# Patient Record
(demographics unavailable — no encounter records)

---

## 2025-06-20 NOTE — PHYSICAL EXAM
[Normal Sclera/Conjunctiva] : normal sclera/conjunctiva [PERRL] : pupils equal round and reactive to light [EOMI] : extraocular movements intact [Normal Oropharynx] : the oropharynx was normal [Normal TMs] : both tympanic membranes were normal [Normal Nasal Mucosa] : the nasal mucosa was normal [No Carotid Bruits] : no carotid bruits [Pedal Pulses Present] : the pedal pulses are present [No Palpable Aorta] : no palpable aorta [No Extremity Clubbing/Cyanosis] : no extremity clubbing/cyanosis [Declined Breast Exam] : declined breast exam  [Normal Supraclavicular Nodes] : no supraclavicular lymphadenopathy [Normal Axillary Nodes] : no axillary lymphadenopathy [Normal Posterior Cervical Nodes] : no posterior cervical lymphadenopathy [Normal Anterior Cervical Nodes] : no anterior cervical lymphadenopathy [Normal Inguinal Nodes] : no inguinal lymphadenopathy [Kyphosis] : kyphosis [Scoliosis] : scoliosis [Normal] : affect was normal and insight and judgment were intact [de-identified] : Left greater than right cataracts. [de-identified] : Mild right greater than left earwax removed without complication. [de-identified] : Distal right lower extremity nonpitting edema.  Cool toes and feet, no libido pattern and excellent pulses with brisk capillary refill. [de-identified] : Lumbar midline scar consistent with prior laminectomy. [de-identified] : Mild right trochanteric tenderness with gentle palpation with hip and knee flexed. [de-identified] : Dry, friable. [de-identified] : Decreased sensation starting above her ankles to monofilament testing.

## 2025-06-20 NOTE — ASSESSMENT
[Vaccines Reviewed] : Immunizations reviewed today. Please see immunization details in the vaccine log within the immunization flowsheet.  [FreeTextEntry1] : *Metastatic papillary serous endometrial carcinoma.IIIC2 11/11/2016 robotic TLH/BSO/debulking of extensive retroperitoneal nodes, carbo/taxol x 4, Carbo/taxotere x 1 (completed 2/2017, last cycle held due to tremors requiring hospitalization). Extended field EBRT 5040 cGy, Vaginal brachytherapy 1200 cGy (5/4-6/20/17). Possible complication of tamoxifen.Follows closely with Dr. Uzma Gay.  *Lumbar ddd and scoliosis complicated by sciatica. Dr. Bustos and Dr. Foy s/p laminectomy February 2023  *R hip pain, apparent bursitis. 2024 hip MRI without overt hip joint pathology. Bilateral inguinal hernia repairs 2024 without pain improvement. Bursal injection relieves her pain for about 9 months.Physical therapy also helping.  Ices as well, discussed Game ready ice sleeve.  Sees Dr. Mcbride in pain medicine.  *chemotherapy induced peripheral neuropathy.  Sensation decreasedBelow Bilateral ankles, Minimal involvement of fingertips per patient report..  *HTN. *Mild creatinine elevation, 1.3-1.5.Blood pressure Seemingly well-controlled on losartan.  Recent normal BMP.  Check urine microalbumin.  *HLD. lipids LDL 52 HDL 63 triglyceride 148 2024.  *Minimal AST elevation.  Regular wine, also Crestor.  *Macrocytosis *Borderline vitamin B12 level. Glass of wine most nights.  Also prior chemotherapy.  *GERD.  Reports  esophagitis large hiatal hernia seemingly H. pylori negative last EGD perhaps at time of last colonoscopy if not more recently. Hesitant to use PPI more than 2 months a year, uses Pepcid instead seeming with adequate control.  *Routine adult health maintenance Vacc:Tdap uncertain Prevnar 22,024 up-to-date COVID flu.  Encouraged RSV.  Asked to bring vaccination record in, query Shingrix. Colon CA scrn:No family history colorectal cancer.  Multiple colonoscopies never adenomas most recently December 2023.  Patient and her gastroenterologist have agreed to discontinue Screening. Breast CA Surveillance/screening: Status post bilateral lumpectomy for left DCIS, nonneoplastic right breast pathology.  Closely followed in oncology. Oporosis screening: DEXA 5/2021 Osteoporosis femoral nect -2.5, spine -1.7, More recent severe osteopenia.To bring last report in. Hep B C HIV screening ordered.Practicing gynecologist.  It was a pleasure to visit with Ms. Sampson today.  Answered all questions as best I could. Disposition portal results.  Follow-up 6 months.

## 2025-06-20 NOTE — HISTORY OF PRESENT ILLNESS
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  She has no acute concerns.  She brings in labs from recent testing notable for  creatinine 1.3 rising to 1.5 in 2025,  AST 49,  -105 without anemia.  LFTs were unremarkable otherwise.  Drinks a glass of wine most nights. Labs showed normal TSH excellent hyperlipidemia control. B12 runs at the lower limit of normal around 225 when she does not supplement.

## 2025-06-20 NOTE — HEALTH RISK ASSESSMENT
[Excellent] : ~his/her~  mood as  excellent [Yes] : Yes [4 or more  times a week (4 pts)] : 4 or more  times a week (4 points) [1 or 2 (0 pts)] : 1 or 2 (0 points) [Never (0 pts)] : Never (0 points) [No] : In the past 12 months have you used drugs other than those required for medical reasons? No [No falls in past year] : Patient reported no falls in the past year [0] : 2) Feeling down, depressed, or hopeless: Not at all (0) [PHQ-2 Negative - No further assessment needed] : PHQ-2 Negative - No further assessment needed [Never] : Never [NO] : No [Patient reported mammogram was abnormal] : Patient reported mammogram was abnormal [Patient declined PAP Smear] : Patient declined PAP Smear [Patient reported bone density results were abnormal] : Patient reported bone density results were abnormal [Patient reported colonoscopy was normal] : Patient reported colonoscopy was normal [HIV Test offered] : HIV Test offered [Hepatitis C test offered] : Hepatitis C test offered [None] : None [With Family] : lives with family [Employed] : employed [Graduate School] : graduate school [] :  [Feels Safe at Home] : Feels safe at home [Fully functional (bathing, dressing, toileting, transferring, walking, feeding)] : Fully functional (bathing, dressing, toileting, transferring, walking, feeding) [Fully functional (using the telephone, shopping, preparing meals, housekeeping, doing laundry, using] : Fully functional and needs no help or supervision to perform IADLs (using the telephone, shopping, preparing meals, housekeeping, doing laundry, using transportation, managing medications and managing finances) [Reports normal functional visual acuity (ie: able to read med bottle)] : Reports normal functional visual acuity [Smoke Detector] : smoke detector [Carbon Monoxide Detector] : carbon monoxide detector [Safety elements used in home] : safety elements used in home [Seat Belt] :  uses seat belt [Sunscreen] : uses sunscreen [Audit-CScore] : 4 [de-identified] : sees PT regularly. Exercise limited by R hip pain. [[facility]] : 10 [[id number]] : 0 [Change in mental status noted] : No change in mental status noted [Language] : denies difficulty with language [Behavior] : denies difficulty with behavior [Learning/Retaining New Information] : denies difficulty learning/retaining new information [Handling Complex Tasks] : denies difficulty handling complex tasks [Reasoning] : denies difficulty with reasoning [Spatial Ability and Orientation] : denies difficulty with spatial ability and orientation [Sexually Active] : not sexually active [High Risk Behavior] : no high risk behavior [Reports changes in hearing] : Reports no changes in hearing [Reports changes in vision] : Reports no changes in vision [Reports changes in dental health] : Reports no changes in dental health [Travel to Developing Areas] : does not  travel to developing areas [TB Exposure] : is not being exposed to tuberculosis [Caregiver Concerns] : does not have caregiver concerns [MammogramComments] : Left DCIS,Non neoplastic right breast status post bilateral lumpectomy.  Followed closely by oncology.   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